# Patient Record
Sex: MALE
[De-identification: names, ages, dates, MRNs, and addresses within clinical notes are randomized per-mention and may not be internally consistent; named-entity substitution may affect disease eponyms.]

---

## 2018-02-06 ENCOUNTER — HOSPITAL ENCOUNTER (OUTPATIENT)
Dept: HOSPITAL 5 - GIO | Age: 53
Discharge: HOME | End: 2018-02-06
Attending: SPECIALIST
Payer: MEDICAID

## 2018-02-06 VITALS — SYSTOLIC BLOOD PRESSURE: 132 MMHG | DIASTOLIC BLOOD PRESSURE: 70 MMHG

## 2018-02-06 DIAGNOSIS — I10: ICD-10-CM

## 2018-02-06 DIAGNOSIS — Z79.84: ICD-10-CM

## 2018-02-06 DIAGNOSIS — K29.80: ICD-10-CM

## 2018-02-06 DIAGNOSIS — Z79.82: ICD-10-CM

## 2018-02-06 DIAGNOSIS — K21.9: ICD-10-CM

## 2018-02-06 DIAGNOSIS — E78.5: ICD-10-CM

## 2018-02-06 DIAGNOSIS — Z95.5: ICD-10-CM

## 2018-02-06 DIAGNOSIS — K44.9: ICD-10-CM

## 2018-02-06 DIAGNOSIS — E03.9: ICD-10-CM

## 2018-02-06 DIAGNOSIS — E66.01: ICD-10-CM

## 2018-02-06 DIAGNOSIS — K29.70: Primary | ICD-10-CM

## 2018-02-06 DIAGNOSIS — Z79.899: ICD-10-CM

## 2018-02-06 DIAGNOSIS — E78.00: ICD-10-CM

## 2018-02-06 DIAGNOSIS — Z83.3: ICD-10-CM

## 2018-02-06 DIAGNOSIS — F31.9: ICD-10-CM

## 2018-02-06 DIAGNOSIS — Z82.49: ICD-10-CM

## 2018-02-06 PROCEDURE — 43235 EGD DIAGNOSTIC BRUSH WASH: CPT

## 2018-02-06 PROCEDURE — 82962 GLUCOSE BLOOD TEST: CPT

## 2018-02-06 NOTE — ANESTHESIA CONSULTATION
Anesthesia Consult and Med Hx


Date of service: 02/06/18





- Airway


Anesthetic Teeth Evaluation: Chipped


ROM Head & Neck: Adequate


Mental/Hyoid Distance: Inadequate


Mallampati Class: Class II


Intubation Access Assessment: Possibly Difficult





- Pulmonary Exam


CTA: Yes





- Cardiac Exam


Cardiac Exam: RRR





- Pre-Operative Health Status


ASA Pre-Surgery Classification: ASA3


Proposed Anesthetic Plan: General, IV Sedation





- Pulmonary


Hx Smoking: No


Hx Asthma: No


Hx Respiratory Symptoms: No


SOB: No


COPD: No


Home Oxygen Therapy: No


Hx Pneumonia: No


Hx Sleep Apnea: No





- Cardiovascular System


Hx Hypertension: Yes


Hx Coronary Artery Disease: Yes


Hx Heart Attack/AMI: Yes (2014)


Hx Angina: Yes


Hx Percutaneous Transluminal Coronary Angioplasty (PTCA): No


Hx Cardia Arrhythmia: No


Hx Pacemaker: No


Hx Internal Defibrillator: No


Hx Valvular Heart Disease: No


Hx Heart Murmur: No


Hx Peripheral Vascular Disease: No





- Central Nervous System


Hx Neuromuscular Disorder: No


Hx Seizures: No


CVA: No


Hx Back Pain: No


Hx Psychiatric Problems: Yes





- Gastrointestinal


Hx Ulcer: No





- Endocrine


Hx Renal Disease: No


Hx End Stage Renal Disease: No


Hx Cirrhosis: No


Hx Liver Disease: No


Hx Insulin Dependent Diabetes: No


Hx Non-Insulin Dependent Diabetes: Yes


Hx Hyperthyroidism: Yes





- Hematic


Hx Anemia: No


Hx Sickle Cell Disease: No





- Other Systems


Hx Alcohol Use: No


Hx Substance Use: No


Hx Cancer: No


Hx Obesity: Yes

## 2018-02-06 NOTE — ANESTHESIA DAY OF SURGERY
Anesthesia Day of Surgery





- Day of Surgery


Patient H&P Reviewed: Yes


Patient is NPO: Yes


Beta Blockers: Yes


Cardiac Clearance: No


Pulmonary Clearance: No


Frederick's Test: N/A

## 2018-02-06 NOTE — OPERATIVE REPORT
Operative Report


Operative Report: 





OPERATIVE REPORT - EGD





DATE 2/6/18





SURGERY:  Upper endoscopy.





SURGEON:  Dr. Banuelos





ASSISTANT: Flavia Madrigal DO





PRE OP DX: dyspepsia, morbid obesity





POST OP DX: gastritis, duodenitis 





TYPE OF ANESTHESIA:  MAC.





ESTIMATED BLOOD LOSS:  None.





COMPLICATIONS:  None.





SPECIMENS REMOVED:  None.





FINDINGS:


1.  Small hiatal hernia.


2.  Otherwise, normal esophagus


3. gastritis 


4. Duodenitis .





INDICATIONS:INDICATION FOR PROCEDURE:  Patient is a 52-year-old male with a 

long history of morbid obesity. He is planned to have a weight loss procedure 

and is here for preoperative planning EGD to assess the anatomy of his stomach. 





PROCEDURE DETAILS: After consent was reviewed, patient was taken back to


the operating room where patient was placed in the left lateral decubitus


position and a bite block was placed in the mouth.  After a time-out was


called, MAC anesthesia was initiated.  I then passed the endoscope into the 

patients


oropharynx, into the esophagus, visualized the entire esophagus, which was all


within normal limits.  I then visualized the stomach and the first portion of


the duodenum which showed gastritis and duodenitits.   I


then retroflexed the scope in the stomach and visualized the hiatus and I


could see a small hiatal hernia.  I then desufflated the stomach and


removed the endoscope.  Patient tolerated procedure well and was transferred


to recovery room in good and stable condition. If the patient is not currently 

on a PPI once will be prescribed for him.

## 2018-02-06 NOTE — DISCHARGE SUMMARY
Providers





- Providers


Attending physician: 


SHRUTHI DIAZ





Primary care physician: 


LEONILA HUSSEIN








Hospitalization


Reason for admission: egd


Condition: Good


Procedures: 





egd


Hospital course: 





52 y.o. male with hx of dyspepsia and morbid obesity presented to endoscopy for 

EGD. He tolerated the procedure well. 


Disposition: DC-01 TO HOME OR SELFCARE





Core Measure Documentation





- Palliative Care


Palliative Care/ Comfort Measures: Not Applicable





- Core Measures


Any of the following diagnoses?: none





Exam





- Physical Exam


Narrative exam: 





no change from prior 





- Constitutional


Vitals: 


 











Temp Pulse Resp BP Pulse Ox


 


 97.6 F   64   15   140/72   95 


 


 02/06/18 07:36  02/06/18 07:36  02/06/18 07:36  02/06/18 07:36  02/06/18 07:36











General appearance: Present: no acute distress





Plan


Activity: no restrictions, other (no driving today)


Diet: other (2 weeks prior to surgery :high protein low carb. 2 days prior 

clear liquids)


Additional Instructions: follow up for surgery.


Follow up with: 


LEONILA HUSSEIN MD [Primary Care Provider] - 7 Days

## 2018-02-13 ENCOUNTER — HOSPITAL ENCOUNTER (INPATIENT)
Dept: HOSPITAL 5 - 3A | Age: 53
LOS: 1 days | Discharge: HOME | DRG: 327 | End: 2018-02-14
Attending: SPECIALIST | Admitting: SPECIALIST
Payer: MEDICAID

## 2018-02-13 DIAGNOSIS — F31.9: ICD-10-CM

## 2018-02-13 DIAGNOSIS — Z95.5: ICD-10-CM

## 2018-02-13 DIAGNOSIS — E11.9: ICD-10-CM

## 2018-02-13 DIAGNOSIS — Z83.3: ICD-10-CM

## 2018-02-13 DIAGNOSIS — E03.9: ICD-10-CM

## 2018-02-13 DIAGNOSIS — Z81.8: ICD-10-CM

## 2018-02-13 DIAGNOSIS — Z83.42: ICD-10-CM

## 2018-02-13 DIAGNOSIS — Z82.49: ICD-10-CM

## 2018-02-13 DIAGNOSIS — I25.10: ICD-10-CM

## 2018-02-13 DIAGNOSIS — E78.00: ICD-10-CM

## 2018-02-13 DIAGNOSIS — I25.2: ICD-10-CM

## 2018-02-13 DIAGNOSIS — I10: ICD-10-CM

## 2018-02-13 DIAGNOSIS — E66.01: ICD-10-CM

## 2018-02-13 DIAGNOSIS — Z71.3: ICD-10-CM

## 2018-02-13 DIAGNOSIS — K44.9: Primary | ICD-10-CM

## 2018-02-13 DIAGNOSIS — Z88.8: ICD-10-CM

## 2018-02-13 DIAGNOSIS — K76.0: ICD-10-CM

## 2018-02-13 PROCEDURE — 88307 TISSUE EXAM BY PATHOLOGIST: CPT

## 2018-02-13 PROCEDURE — 85025 COMPLETE CBC W/AUTO DIFF WBC: CPT

## 2018-02-13 PROCEDURE — 94760 N-INVAS EAR/PLS OXIMETRY 1: CPT

## 2018-02-13 PROCEDURE — 82962 GLUCOSE BLOOD TEST: CPT

## 2018-02-13 PROCEDURE — 0BQT4ZZ REPAIR DIAPHRAGM, PERCUTANEOUS ENDOSCOPIC APPROACH: ICD-10-PCS | Performed by: SURGERY

## 2018-02-13 PROCEDURE — 80048 BASIC METABOLIC PNL TOTAL CA: CPT

## 2018-02-13 PROCEDURE — 36415 COLL VENOUS BLD VENIPUNCTURE: CPT

## 2018-02-13 PROCEDURE — 0DB64Z3 EXCISION OF STOMACH, PERCUTANEOUS ENDOSCOPIC APPROACH, VERTICAL: ICD-10-PCS | Performed by: SURGERY

## 2018-02-13 RX ADMIN — HYDROMORPHONE HYDROCHLORIDE PRN MG: 1 INJECTION, SOLUTION INTRAMUSCULAR; INTRAVENOUS; SUBCUTANEOUS at 17:20

## 2018-02-13 RX ADMIN — SIMETHICONE PRN MG: 80 TABLET, CHEWABLE ORAL at 17:20

## 2018-02-13 RX ADMIN — LITHIUM CARBONATE SCH: 300 CAPSULE, GELATIN COATED ORAL at 23:43

## 2018-02-13 RX ADMIN — PROPRANOLOL HYDROCHLORIDE SCH: 40 TABLET ORAL at 23:43

## 2018-02-13 RX ADMIN — SODIUM CHLORIDE, SODIUM LACTATE, POTASSIUM CHLORIDE, AND CALCIUM CHLORIDE SCH MLS/HR: .6; .31; .03; .02 INJECTION, SOLUTION INTRAVENOUS at 10:15

## 2018-02-13 RX ADMIN — HYDROMORPHONE HYDROCHLORIDE PRN MG: 1 INJECTION, SOLUTION INTRAMUSCULAR; INTRAVENOUS; SUBCUTANEOUS at 23:59

## 2018-02-13 RX ADMIN — FENTANYL CITRATE PRN MCG: 50 INJECTION, SOLUTION INTRAMUSCULAR; INTRAVENOUS at 13:43

## 2018-02-13 RX ADMIN — FENTANYL CITRATE PRN MCG: 50 INJECTION, SOLUTION INTRAMUSCULAR; INTRAVENOUS at 14:10

## 2018-02-13 RX ADMIN — SODIUM CHLORIDE, SODIUM LACTATE, POTASSIUM CHLORIDE, AND CALCIUM CHLORIDE SCH MLS/HR: .6; .31; .03; .02 INJECTION, SOLUTION INTRAVENOUS at 20:52

## 2018-02-13 RX ADMIN — HYDROMORPHONE HYDROCHLORIDE PRN MG: 1 INJECTION, SOLUTION INTRAMUSCULAR; INTRAVENOUS; SUBCUTANEOUS at 20:49

## 2018-02-13 NOTE — ANESTHESIA CONSULTATION
Anesthesia Consult and Med Hx


Date of service: 02/13/18





- Airway


Anesthetic Teeth Evaluation: Good


ROM Head & Neck: Adequate


Mental/Hyoid Distance: Adequate


Mallampati Class: Class II


Intubation Access Assessment: Probably Good





- Pulmonary Exam


CTA: Yes





- Cardiac Exam


Cardiac Exam: RRR





- Pre-Operative Health Status


ASA Pre-Surgery Classification: ASA3


Proposed Anesthetic Plan: General





- Pulmonary


Hx Smoking: No


Hx Asthma: No


Hx Respiratory Symptoms: No


SOB: No


COPD: No


Hx Pneumonia: No


Hx Sleep Apnea: No (SLEEP STUDY NEGATIVE)





- Cardiovascular System


Hx Hypertension: Yes (20 YEARS)


Hx Coronary Artery Disease: Yes (> 4 METS)


Hx Heart Attack/AMI: Yes (2013)


Hx Percutaneous Transluminal Coronary Angioplasty (PTCA): Yes (stent x 1)


Hx Cardia Arrhythmia: No


Hx Pacemaker: No


Hx Internal Defibrillator: No


Hx Valvular Heart Disease: No


Hx Heart Murmur: No


Hx Peripheral Vascular Disease: No





- Central Nervous System


Hx Neuromuscular Disorder: Yes (essential tremor)


Hx Seizures: No


CVA: No


Hx Back Pain: No


Hx Psychiatric Problems: Yes





- Gastrointestinal


Hx Ulcer: No





- Endocrine


Hx Renal Disease: No


Hx End Stage Renal Disease: No


Hx Cirrhosis: No


Hx Liver Disease: Yes (FATTY LIVER)


Hx Insulin Dependent Diabetes: No


Hx Non-Insulin Dependent Diabetes: Yes


Hx Thyroid Disease: Yes


Hx Hypothyroidism: Yes





- Hematic


Hx Anemia: No


Hx Sickle Cell Disease: No





- Other Systems


Hx Alcohol Use: No


Hx Substance Use: No


Hx Cancer: No


Hx Obesity: Yes

## 2018-02-13 NOTE — OPERATIVE REPORT
Operative Report


Operative Report: 





Operative Report 


DATE OF PROCEDURE: 2/13/18


PREOPERATIVE DIAGNOSES: Morbid obesity, hiatal hernia 





POSTOPERATIVE DIAGNOSES: 


1.same as pre-op





SURGEON: Dr. Banuelos


ASSISTANT: Kody Guevara DO, CST





PROCEDURE: 


1. laparoscopic sleeve gastrectomy 


2. laparoscopic hiatal hernia repair 








ANESTHESIA: General.





ESTIMATED BLOOD LOSS: <5 mL.





COMPLICATIONS: None.





SPECIMEN: Partial gastrectomy.





FINDINGS: 


1. hiatal hernia 





INDICATION FOR PROCEDURE: Patient is  52 year-old M with a long history of 

morbid obesity. The patient has tried multiple efforts at weight loss without 

long term success. Pt is here today for sleeve gastrectomy. 





PROCEDURE IN DETAIL: After consent was reviewed, patient was taken back to the 

operating room, where patient was placed supine on the bed with both arms out. 

The patient's legs were doubly strapped to the bed. Patient had a foot board in 

place. Patient had a body warmer placed by anesthesia. Patient was then prepped 

and draped in normal sterile surgical fashion. After a time-out was called, I 

made a stab incision in the umbilicus and placed a Veress needle through this 

incision and insufflated the abdomen. Proper insuflation could not be 

accomplished, therefore, a stab incision was made in the left upper quadrant 

and the veress was palced into the abdomen and insulflated to 18 mmHg pressure. 

Once the abdomen was adequately insuflated I widened the umbical incision were 

the veress had been placed and inserted a 15mm trocar. I then placed a 45-

degree scope through this port and inspected the abdomen. There was no injury 

on entry of the abdomen. I then placed two 5-mm ports in the right upper 

quadrant, one along the anterior axillary line and 1 subxiphoid below the 

costovertebral angle. I then placed left upper quadrant port along the anterior 

axillary line in a similar fashion. 


I then placed the liver retractor through the subxiphoid port and placed the 

patient in full reverse Trendelenburg. The right and left crura were 

skeletonized accentuating a small hiatal hernia. An anterior cruraplasty was 

perfromed with a figure-of-8 stitch using endostitch with 0 ethibond suture to 

reapproximate the crura.





I then identified the pylorus and then counted off 6cm from the pylorus. I then 

used a LigaSure cutting device to enter into the lesser sac. At that point and 

then I took down the short gastrics all the way up to the left wilfredo. 


Then I had anesthesia pass down a 40 Sami bougie along the lesser curvature 

of the stomach. I made sure everything else was out of the abdomen except the 

bougie. I then created my gastric sleeve using a 60-mm laparoscopic stapler.  

The sleeve looked good without any twisting or torsion. I then had anesthesia 

to remove the bougie. Hemostasis was obtained along the staple line with 

electrocautery and a few 10mm clips.  I then removed liver grasper and took it 

off the field. 


I then removed the stomach through the 15-mm umbilcal port. I then closed that 

fascia with a #1 PDS in a figure-of-eight fashion using a Kenroy-Elvin. I 

then desufflated the abdomen and then removed all port sites. I then closed the 

incisions with 4-0 Monocryl in subcuticular fashion. I then dressed the wounds 

with steristrips, gauze and tegaderms. Patient tolerated the procedure well and 

was transferred to recovery room in good and stable condition

## 2018-02-14 VITALS — DIASTOLIC BLOOD PRESSURE: 74 MMHG | SYSTOLIC BLOOD PRESSURE: 138 MMHG

## 2018-02-14 LAB
BASOPHILS # (AUTO): 0 K/MM3 (ref 0–0.1)
BASOPHILS # (AUTO): 0 K/MM3 (ref 0–0.1)
BASOPHILS NFR BLD AUTO: 0.3 % (ref 0–1.8)
BASOPHILS NFR BLD AUTO: 0.4 % (ref 0–1.8)
BUN SERPL-MCNC: 12 MG/DL (ref 9–20)
BUN/CREAT SERPL: 12 %
CALCIUM SERPL-MCNC: 9.1 MG/DL (ref 8.4–10.2)
EOSINOPHIL # BLD AUTO: 0 K/MM3 (ref 0–0.4)
EOSINOPHIL # BLD AUTO: 0 K/MM3 (ref 0–0.4)
EOSINOPHIL NFR BLD AUTO: 0.2 % (ref 0–4.3)
EOSINOPHIL NFR BLD AUTO: 0.3 % (ref 0–4.3)
HCT VFR BLD CALC: 39.7 % (ref 35.5–45.6)
HCT VFR BLD CALC: 42 % (ref 35.5–45.6)
HEMOLYSIS INDEX: 5
HGB BLD-MCNC: 13.8 GM/DL (ref 11.8–15.2)
HGB BLD-MCNC: 13.9 GM/DL (ref 11.8–15.2)
LYMPHOCYTES # BLD AUTO: 1.2 K/MM3 (ref 1.2–5.4)
LYMPHOCYTES # BLD AUTO: 1.4 K/MM3 (ref 1.2–5.4)
LYMPHOCYTES NFR BLD AUTO: 11.7 % (ref 13.4–35)
LYMPHOCYTES NFR BLD AUTO: 13.2 % (ref 13.4–35)
MCH RBC QN AUTO: 32 PG (ref 28–32)
MCH RBC QN AUTO: 33 PG (ref 28–32)
MCHC RBC AUTO-ENTMCNC: 33 % (ref 32–34)
MCHC RBC AUTO-ENTMCNC: 35 % (ref 32–34)
MCV RBC AUTO: 94 FL (ref 84–94)
MCV RBC AUTO: 96 FL (ref 84–94)
MONOCYTES # (AUTO): 0.8 K/MM3 (ref 0–0.8)
MONOCYTES # (AUTO): 0.9 K/MM3 (ref 0–0.8)
MONOCYTES % (AUTO): 7.3 % (ref 0–7.3)
MONOCYTES % (AUTO): 8.4 % (ref 0–7.3)
PLATELET # BLD: 244 K/MM3 (ref 140–440)
PLATELET # BLD: 244 K/MM3 (ref 140–440)
RBC # BLD AUTO: 4.21 M/MM3 (ref 3.65–5.03)
RBC # BLD AUTO: 4.39 M/MM3 (ref 3.65–5.03)

## 2018-02-14 RX ADMIN — SODIUM CHLORIDE, SODIUM LACTATE, POTASSIUM CHLORIDE, AND CALCIUM CHLORIDE SCH MLS/HR: .6; .31; .03; .02 INJECTION, SOLUTION INTRAVENOUS at 09:42

## 2018-02-14 RX ADMIN — SIMETHICONE PRN MG: 80 TABLET, CHEWABLE ORAL at 09:25

## 2018-02-14 RX ADMIN — LITHIUM CARBONATE SCH MG: 300 CAPSULE, GELATIN COATED ORAL at 09:27

## 2018-02-14 RX ADMIN — SODIUM CHLORIDE, SODIUM LACTATE, POTASSIUM CHLORIDE, AND CALCIUM CHLORIDE SCH MLS/HR: .6; .31; .03; .02 INJECTION, SOLUTION INTRAVENOUS at 03:41

## 2018-02-14 RX ADMIN — ONDANSETRON PRN MG: 2 INJECTION INTRAMUSCULAR; INTRAVENOUS at 09:25

## 2018-02-14 RX ADMIN — PROPRANOLOL HYDROCHLORIDE SCH MG: 40 TABLET ORAL at 09:28

## 2018-02-14 RX ADMIN — ONDANSETRON PRN MG: 2 INJECTION INTRAMUSCULAR; INTRAVENOUS at 05:05

## 2018-02-14 NOTE — DISCHARGE SUMMARY
Providers





- Providers


Date of Admission: 


02/13/18 08:30





Date of discharge: 02/14/18


Attending physician: 


SHRUTHI DIAZ





Primary care physician: 


LEONILA HUSSEIN








Hospitalization


Reason for admission: Morbid Obesity


Condition: Stable


Procedures: 


Laproscopic gastric sleeve and hiatal hernia repair 


Hospital course: 





Pt. is a 52 y.o. male with a Hx of morbid obesity. Pt. has tried multiple 

efforts with weight loss and failed to achieve success. Pt presented at 2/13 

for lap gastric sleeve for weight loss. Pt. had and uneventful lap sleeve 

gastrectomy with hiatal hernia repair done on 2/13. On 2/14, Pt. complained of 

left shoulder pain, pain is 4/10 normally and exacerbated with arm movement to 8

/10, pain is new onset and it has never happened before. An xr of his left 

shoulder was ordered but pt refused the test. He told the nurse he wishes to 

follow up with his pcp. On the day of discharge, pt. reports minor nausea with 

no vomiting, +flatus, +ambulation. Pt. is in stable condition with no fevers or 

chills. Pt. remains afebrile and has no leukocytosis.  Pt will be given the 

information for an ortho referral if needed for the left shoulder. 


Disposition: DC-01 TO HOME OR SELFCARE





- Discharge Diagnoses


(1) Morbid obesity


Status: Acute   





Core Measure Documentation





- Palliative Care


Palliative Care/ Comfort Measures: Not Applicable





- Core Measures


Any of the following diagnoses?: none





Exam





- Constitutional


Vitals: 


 











Temp Pulse Resp BP Pulse Ox


 


 98.4 F   80   20   143/81   93 


 


 02/14/18 12:19  02/14/18 12:19  02/14/18 12:19  02/14/18 12:19  02/14/18 12:19











General appearance: Present: no acute distress, well-nourished, obese





- Respiratory


Respiratory effort: normal


Respiratory: bilateral: CTA, negative: diminished, rales, rhonchi, wheezing, 

other





- Cardiovascular


Rhythm: regular


Heart Sounds: Present: S1 & S2





- Extremities


Extremities: no ischemia, pulses intact, No edema


Extremity abnormal: other (Focal tenderness at the left anterolateral shoulder 

with palpation. Pain with active elevation of the left shoulder past 90 

degrees. Positive left shoulder empty can test)


Peripheral Pulses: within normal limits





- Abdominal


General gastrointestinal: Present: soft, tender (epigastric tenderness with 

light palpation), non-distended, normal bowel sounds, other (no garuding, no 

rebound. incisions sites: dressings with minimal blood stained. )


Male genitourinary: Present: deferred





- Rectal


Rectal Exam: deferred





- Integumentary


Integumentary: Present: clear, warm, normal turgor





- Psychiatric


Psychiatric: appropriate mood/affect





Plan


Diet: clear liquids, other (sugar free clears. Advance diet per bariatric 

handout. No lifting >15lbs for 6 weeks. Goal fluid intake is 64oz a day. Goal 

protein intake is 60g a day. You may sleep 4 hrs at night but must get up and 

walk. )


Wound: keep clean and dry


Additional Instructions: Keep dressings clean and dry until follow up in the 

office for your wound check. You may restart your plavix Friday, Feb. 16th. You 

must walk often. At night you can sleep for 4 hrs then wake up and walk and 

then go back to sleep. You must walk once every 1-2hrs. Goal fluid intake is 

64oz a day. Goal protein intake is 60g a day. Remember to take a bariatric 

multivitamin. Crush medications. continue to take carafate and omeprazole for 

one more week. An orthopedics doctor information has been provided if your 

shoulder continues to have pain and decreased movement.


Follow up with: 


LEONILA HUSSEIN MD [Primary Care Provider] - 7 Days


TAMIE WREN MD [Staff Physician] - 14 Days (Follow up as needed if 

left shoulder continues to have lack of movement or pain. Xrays were refused in 

the hospital )